# Patient Record
Sex: MALE | Race: WHITE | NOT HISPANIC OR LATINO | Employment: FULL TIME | URBAN - METROPOLITAN AREA
[De-identification: names, ages, dates, MRNs, and addresses within clinical notes are randomized per-mention and may not be internally consistent; named-entity substitution may affect disease eponyms.]

---

## 2023-07-21 PROBLEM — I48.91 ATRIAL FIBRILLATION (HCC): Status: ACTIVE | Noted: 2023-07-21

## 2023-07-21 PROBLEM — G93.89 ENCEPHALOMALACIA: Status: ACTIVE | Noted: 2023-07-21

## 2023-07-21 PROBLEM — S32.039A CLOSED FRACTURE OF THIRD LUMBAR VERTEBRA (HCC): Status: ACTIVE | Noted: 2023-07-21

## 2023-07-21 PROBLEM — G40.909 EPILEPSY (HCC): Status: ACTIVE | Noted: 2017-09-11

## 2024-02-12 ENCOUNTER — APPOINTMENT (OUTPATIENT)
Dept: RADIOLOGY | Facility: HOSPITAL | Age: 49
End: 2024-02-12
Payer: OTHER MISCELLANEOUS

## 2024-02-12 ENCOUNTER — HOSPITAL ENCOUNTER (EMERGENCY)
Facility: HOSPITAL | Age: 49
Discharge: HOME | End: 2024-02-12
Attending: EMERGENCY MEDICINE
Payer: OTHER MISCELLANEOUS

## 2024-02-12 VITALS
TEMPERATURE: 97.9 F | HEART RATE: 56 BPM | SYSTOLIC BLOOD PRESSURE: 180 MMHG | BODY MASS INDEX: 29.19 KG/M2 | RESPIRATION RATE: 18 BRPM | OXYGEN SATURATION: 99 % | DIASTOLIC BLOOD PRESSURE: 117 MMHG | HEIGHT: 67 IN | WEIGHT: 186 LBS

## 2024-02-12 DIAGNOSIS — S46.911A STRAIN OF RIGHT SHOULDER, INITIAL ENCOUNTER: Primary | ICD-10-CM

## 2024-02-12 PROCEDURE — 99283 EMERGENCY DEPT VISIT LOW MDM: CPT

## 2024-02-12 PROCEDURE — 73030 X-RAY EXAM OF SHOULDER: CPT

## 2024-02-12 PROCEDURE — 99284 EMERGENCY DEPT VISIT MOD MDM: CPT | Performed by: PHYSICIAN ASSISTANT

## 2024-02-12 PROCEDURE — 96372 THER/PROPH/DIAG INJ SC/IM: CPT

## 2024-02-12 RX ORDER — LIDOCAINE 50 MG/G
1 PATCH TOPICAL ONCE
Status: DISCONTINUED | OUTPATIENT
Start: 2024-02-12 | End: 2024-02-12 | Stop reason: HOSPADM

## 2024-02-12 RX ORDER — KETOROLAC TROMETHAMINE 30 MG/ML
30 INJECTION, SOLUTION INTRAMUSCULAR; INTRAVENOUS ONCE
Status: COMPLETED | OUTPATIENT
Start: 2024-02-12 | End: 2024-02-12

## 2024-02-12 RX ORDER — KETOROLAC TROMETHAMINE 30 MG/ML
30 INJECTION, SOLUTION INTRAMUSCULAR; INTRAVENOUS ONCE
Status: DISCONTINUED | OUTPATIENT
Start: 2024-02-12 | End: 2024-02-12

## 2024-02-12 RX ADMIN — LIDOCAINE 5% 1 PATCH: 700 PATCH TOPICAL at 21:07

## 2024-02-12 RX ADMIN — KETOROLAC TROMETHAMINE 30 MG: 30 INJECTION, SOLUTION INTRAMUSCULAR; INTRAVENOUS at 21:07

## 2024-02-12 NOTE — Clinical Note
Tanmay Bhat was seen and treated in our emergency department on 2/12/2024.        No work until cleared by Family Doctor/Orthopedics        Diagnosis:     Tanmay  .    He may return on this date:          If you have any questions or concerns, please don't hesitate to call.      Yamile Odell PA-C    ______________________________           _______________          _______________  Hospital Representative                              Date                                Time

## 2024-02-13 NOTE — ED PROVIDER NOTES
"History  Chief Complaint   Patient presents with    Shoulder Injury     Patient presents to ED with right shoulder pain/injury while hooking up van to toe truck felt a \"pop\"     Patient is a 47 y/o M that presents to the ED with right shoulder pain.  He states he was at work and was loading a car on the truck and felt a sharp pain in his right shoulder.  Pain is worse with movement.  He is right handed.  No prior injury to right shoulder.  No numbness or weakness.  He did not take anything for the pain.        History provided by:  Patient  Shoulder Injury  Associated symptoms: no fever        Prior to Admission Medications   Prescriptions Last Dose Informant Patient Reported? Taking?   EPINEPHrine (EPIPEN 2-CHANTAL IJ)   Yes No   Sig: Inject as directed   Eliquis 5 MG   Yes No   Sig: Take 5 mg by mouth 2 (two) times a day   lamoTRIgine (LaMICtal) 100 mg tablet   Yes No   Sig: Every 12 hours   metoprolol succinate (TOPROL-XL) 50 mg 24 hr tablet   Yes No   Sig: Take 50 mg by mouth daily      Facility-Administered Medications: None       Past Medical History:   Diagnosis Date    Atrial fibrillation (HCC)        History reviewed. No pertinent surgical history.    History reviewed. No pertinent family history.  I have reviewed and agree with the history as documented.    E-Cigarette/Vaping     E-Cigarette/Vaping Substances     Social History     Tobacco Use    Smoking status: Never    Smokeless tobacco: Never   Substance Use Topics    Alcohol use: Not Currently     Comment: social    Drug use: Never       Review of Systems   Constitutional:  Negative for chills and fever.   Musculoskeletal:         Right shoulder pain   Skin:  Negative for color change and rash.   Neurological:  Negative for dizziness, weakness and numbness.   All other systems reviewed and are negative.      Physical Exam  Physical Exam  Vitals and nursing note reviewed.   Constitutional:       General: He is not in acute distress.     Appearance: Normal " appearance. He is well-developed, well-groomed and normal weight. He is not ill-appearing or diaphoretic.   HENT:      Head: Normocephalic and atraumatic.      Right Ear: External ear normal.      Left Ear: External ear normal.      Nose: Nose normal.   Eyes:      Conjunctiva/sclera: Conjunctivae normal.   Cardiovascular:      Rate and Rhythm: Regular rhythm. Bradycardia present.      Pulses:           Radial pulses are 2+ on the right side.   Pulmonary:      Effort: Pulmonary effort is normal.      Breath sounds: Normal breath sounds.   Musculoskeletal:      Right shoulder: Tenderness present. No swelling or deformity. Decreased range of motion (due to pain). Normal pulse.      Right upper arm: Normal.      Right elbow: Normal.      Right forearm: Normal.      Right wrist: Normal.      Right hand: Normal.      Cervical back: Normal range of motion. No spinous process tenderness or muscular tenderness.   Skin:     General: Skin is warm and dry.      Findings: No bruising or erythema.   Neurological:      Mental Status: He is alert and oriented to person, place, and time.      Sensory: Sensation is intact. No sensory deficit.   Psychiatric:         Behavior: Behavior is cooperative.         Vital Signs  ED Triage Vitals   Temperature Pulse Respirations Blood Pressure SpO2   02/12/24 1839 02/12/24 1839 02/12/24 1839 02/12/24 1842 02/12/24 1839   97.9 °F (36.6 °C) 56 18 (!) 180/117 99 %      Temp Source Heart Rate Source Patient Position - Orthostatic VS BP Location FiO2 (%)   02/12/24 1839 02/12/24 1839 02/12/24 1839 02/12/24 1839 --   Temporal Monitor Sitting Right arm       Pain Score       02/12/24 1839       9           Vitals:    02/12/24 1839 02/12/24 1842   BP:  (!) 180/117   Pulse: 56    Patient Position - Orthostatic VS: Sitting          Visual Acuity      ED Medications  Medications   ketorolac (TORADOL) injection 30 mg (30 mg Intramuscular Given 2/12/24 2107)       Diagnostic Studies  Results Reviewed        None                   XR shoulder 2+ views RIGHT   ED Interpretation by Yamile Odell PA-C (02/12 2046)   No acute abnormalities.                  Procedures  Splint application    Date/Time: 2/12/2024 8:45 PM    Performed by: Yamile Odell PA-C  Authorized by: Yamile Odell PA-C  Universal Protocol:  Consent: Verbal consent obtained.  Consent given by: patient  Patient identity confirmed: verbally with patient    Pre-procedure details:     Sensation:  Normal  Procedure details:     Laterality:  Right    Location:  Shoulder    Shoulder:  R shoulder    Supplies:  Sling  Post-procedure details:     Pain:  Unchanged    Sensation:  Normal    Patient tolerance of procedure:  Tolerated well, no immediate complications           ED Course                               SBIRT 20yo+      Flowsheet Row Most Recent Value   Initial Alcohol Screen: US AUDIT-C     1. How often do you have a drink containing alcohol? 0 Filed at: 02/12/2024 1841   2. How many drinks containing alcohol do you have on a typical day you are drinking?  0 Filed at: 02/12/2024 1841   3a. Male UNDER 65: How often do you have five or more drinks on one occasion? 0 Filed at: 02/12/2024 1841   3b. FEMALE Any Age, or MALE 65+: How often do you have 4 or more drinks on one occassion? 0 Filed at: 02/12/2024 1841   Audit-C Score 0 Filed at: 02/12/2024 1841   MARGRET: How many times in the past year have you...    Used an illegal drug or used a prescription medication for non-medical reasons? Never Filed at: 02/12/2024 1841                      Medical Decision Making  Patient with right shoulder pain while lifting at work, will order xray to r/o AC separation or bony abnormality.  No acute abnormalities on xray independently interpreted by me.  Will apply sling and refer to ortho for further evaluation.     Amount and/or Complexity of Data Reviewed  Radiology: ordered and independent interpretation performed.    Risk  Prescription drug  management.             Disposition  Final diagnoses:   Strain of right shoulder, initial encounter     Time reflects when diagnosis was documented in both MDM as applicable and the Disposition within this note       Time User Action Codes Description Comment    2/12/2024  8:57 PM Yamile Odell Add [S46.911A] Strain of right shoulder, initial encounter           ED Disposition       ED Disposition   Discharge    Condition   Stable    Date/Time   Mon Feb 12, 2024 2051    Comment   Tanmay Bhat discharge to home/self care.                   Follow-up Information       Follow up With Specialties Details Why Contact Info Additional Information    [facility] Schedule an appointment as soon as possible for a visit  For recheck [address]  [address]  [facility] [address] [phone] [facility], [facility], [address] [place], [place], [address] [phone]            Discharge Medication List as of 2/12/2024  8:59 PM        START taking these medications    Details   Diclofenac Sodium (VOLTAREN) 1 % Apply 2 g topically 4 (four) times a day, Starting Mon 2/12/2024, Normal           CONTINUE these medications which have NOT CHANGED    Details   Eliquis 5 MG Take 5 mg by mouth 2 (two) times a day, Starting Mon 5/15/2023, Historical Med      EPINEPHrine (EPIPEN 2-CHANTAL IJ) Inject as directed, Historical Med      lamoTRIgine (LaMICtal) 100 mg tablet Every 12 hours, Historical Med      metoprolol succinate (TOPROL-XL) 50 mg 24 hr tablet Take 50 mg by mouth daily, Starting Tue 5/16/2023, Historical Med                 PDMP Review         Value Time User    PDMP Reviewed  Yes 7/21/2023  7:53 AM Manny Delcid DO            ED Provider  Electronically Signed by             Yamile Odell PA-C  02/12/24 7610

## 2024-02-13 NOTE — DISCHARGE INSTRUCTIONS
Rest, ice, elevate arm.  Tylenol/motrin for discomfort.  Use voltaren gel as directed.  Call ortho tomorrow for a follow up appointment.  Sling for 2 days, then start exercising your arm.

## 2024-04-16 ENCOUNTER — HOSPITAL ENCOUNTER (OUTPATIENT)
Dept: HOSPITAL 99 - EEG | Age: 49
End: 2024-04-16
Payer: COMMERCIAL

## 2024-04-16 DIAGNOSIS — R56.9: Primary | ICD-10-CM

## 2025-03-18 ENCOUNTER — HOSPITAL ENCOUNTER (OUTPATIENT)
Dept: HOSPITAL 99 - EEG | Age: 50
End: 2025-03-18
Payer: COMMERCIAL

## 2025-03-18 DIAGNOSIS — R56.9: Primary | ICD-10-CM
